# Patient Record
Sex: FEMALE | Race: WHITE | NOT HISPANIC OR LATINO | URBAN - NONMETROPOLITAN AREA
[De-identification: names, ages, dates, MRNs, and addresses within clinical notes are randomized per-mention and may not be internally consistent; named-entity substitution may affect disease eponyms.]

---

## 2019-03-15 ENCOUNTER — IMPORTED ENCOUNTER (OUTPATIENT)
Dept: URBAN - NONMETROPOLITAN AREA CLINIC 1 | Facility: CLINIC | Age: 59
End: 2019-03-15

## 2019-03-15 PROBLEM — H43.812: Noted: 2019-03-15

## 2019-03-15 PROCEDURE — 92014 COMPRE OPH EXAM EST PT 1/>: CPT

## 2019-03-15 NOTE — PATIENT DISCUSSION
PVD OS-Retina flat 360 with no breaks tears or heme.-S&S of RD/RT reviewed with pt.-Stressed that pt should contact our office right away with any changes or increase in symptoms.; 's Notes: ok to fill pf

## 2019-09-24 ENCOUNTER — IMPORTED ENCOUNTER (OUTPATIENT)
Dept: URBAN - NONMETROPOLITAN AREA CLINIC 1 | Facility: CLINIC | Age: 59
End: 2019-09-24

## 2019-09-24 PROBLEM — H10.45: Noted: 2019-09-24

## 2019-09-24 PROBLEM — H15.122: Noted: 2019-09-24

## 2019-09-24 PROCEDURE — 92012 INTRM OPH EXAM EST PATIENT: CPT

## 2019-09-24 NOTE — PATIENT DISCUSSION
EPISCLERTIS OS MILDEDUCATE PTSTART ZYLET TID OS X 5 DAYS*Allergic Conjunctivitis:.-Discussed findings of exam in detail with the patient. - Discussed the episodic nature of the condition and treatment options with the patient. - The use of artificial tears and cool compresses were discussed with patient. - The offending allergen should be avoided if possible. - Patient advised not to rub eyes. - Prescription drops recommended. SAMPLES GIVEN BEPREVE BID OU; 's Notes: ok to fill pf

## 2019-12-17 ENCOUNTER — IMPORTED ENCOUNTER (OUTPATIENT)
Dept: URBAN - NONMETROPOLITAN AREA CLINIC 1 | Facility: CLINIC | Age: 59
End: 2019-12-17

## 2019-12-17 PROBLEM — H52.223: Noted: 2019-12-17

## 2019-12-17 PROBLEM — H40.013: Noted: 2019-12-17

## 2019-12-17 PROBLEM — H52.13: Noted: 2019-12-17

## 2019-12-17 PROCEDURE — 92133 CPTRZD OPH DX IMG PST SGM ON: CPT

## 2019-12-17 PROCEDURE — 92014 COMPRE OPH EXAM EST PT 1/>: CPT

## 2019-12-17 NOTE — PATIENT DISCUSSION
Myopia-Discussed diagnosis with patient. -Explained that people who are myopic are at a higher risk for developing RD/RT and reviewed associated S&S.-Pt to contact our office if symptoms develop. Astigmatism-Discussed diagnosis with patient. Updated spec Rx given. Recommend lens that will provide comfort as well as protect safety and health of eyes. Glaucoma Suspect-Based on bordeline IOP-Appears stable at this time.-Continue to monitor with exams and testing.; 's Notes: ok to fill pf

## 2020-02-12 ENCOUNTER — IMPORTED ENCOUNTER (OUTPATIENT)
Dept: URBAN - NONMETROPOLITAN AREA CLINIC 1 | Facility: CLINIC | Age: 60
End: 2020-02-12

## 2020-02-12 PROBLEM — H52.223: Noted: 2020-02-12

## 2020-02-12 PROBLEM — H43.812: Noted: 2020-02-12

## 2020-02-12 PROBLEM — H52.13: Noted: 2020-02-12

## 2020-02-12 PROBLEM — H40.013: Noted: 2020-02-12

## 2020-02-12 PROCEDURE — 92014 COMPRE OPH EXAM EST PT 1/>: CPT

## 2020-02-12 NOTE — PATIENT DISCUSSION
PVD OSEDUCATE PTON S&S OF RDMONITORMyopia-Discussed diagnosis with patient. -Explained that people who are myopic are at a higher risk for developing RD/RT and reviewed associated S&S.-Pt to contact our office if symptoms develop. Astigmatism-Discussed diagnosis with patient. Updated spec Rx given. Recommend lens that will provide comfort as well as protect safety and health of eyes. Glaucoma Suspect-Based on bordeline IOP-Appears stable at this time.-Continue to monitor with exams and testing.; 's Notes: ok to fill pf

## 2020-12-21 ENCOUNTER — IMPORTED ENCOUNTER (OUTPATIENT)
Dept: URBAN - NONMETROPOLITAN AREA CLINIC 1 | Facility: CLINIC | Age: 60
End: 2020-12-21

## 2020-12-21 PROBLEM — H40.013: Noted: 2020-12-21

## 2020-12-21 PROBLEM — H52.13: Noted: 2020-12-21

## 2020-12-21 PROBLEM — H52.223: Noted: 2020-12-21

## 2020-12-21 PROCEDURE — 92014 COMPRE OPH EXAM EST PT 1/>: CPT

## 2020-12-21 PROCEDURE — 92015 DETERMINE REFRACTIVE STATE: CPT

## 2020-12-21 NOTE — PATIENT DISCUSSION
Glaucoma Suspect-Based on bordeline IOP-Appears stable at this time.-Continue to monitor with exams and testing. Myopia-Discussed diagnosis with patient. -Explained that people who are myopic are at a higher risk for developing RD/RT and reviewed associated S&S.-Pt to contact our office if symptoms develop. Astigmatism-Discussed diagnosis with patient. Updated spec Rx given.   Recommend lens that will provide comfort as well as protect safety and health of eyes.; 's Notes: ok to fill pf

## 2021-08-24 ENCOUNTER — IMPORTED ENCOUNTER (OUTPATIENT)
Dept: URBAN - NONMETROPOLITAN AREA CLINIC 1 | Facility: CLINIC | Age: 61
End: 2021-08-24

## 2021-08-24 PROBLEM — H52.223: Noted: 2021-08-24

## 2021-08-24 PROBLEM — H43.811: Noted: 2021-08-24

## 2021-08-24 PROBLEM — H40.013: Noted: 2021-08-24

## 2021-08-24 PROBLEM — H52.13: Noted: 2021-08-24

## 2021-08-24 PROCEDURE — 99214 OFFICE O/P EST MOD 30 MIN: CPT

## 2021-08-24 NOTE — PATIENT DISCUSSION
PVD OD-Retina flat 360 with no breaks tears or heme.-S&S of RD/RT reviewed with pt.-Stressed that pt should contact our office right away with any changes or increase in symptoms.; 's Notes: ok to fill pf

## 2021-11-01 ENCOUNTER — IMPORTED ENCOUNTER (OUTPATIENT)
Dept: URBAN - NONMETROPOLITAN AREA CLINIC 1 | Facility: CLINIC | Age: 61
End: 2021-11-01

## 2021-11-01 PROCEDURE — 92014 COMPRE OPH EXAM EST PT 1/>: CPT

## 2021-11-01 NOTE — PATIENT DISCUSSION
PVD OD OLD-Retina flat 360 with no breaks tears or heme.-S&S of RD/RT reviewed with pt.-Stressed that pt should contact our office right away with any changes or increase in symptoms. Glaucoma Suspect-Based on IOP-Appears stable at this time.-Continue to monitor with exams and testing. Myopia-Discussed diagnosis with patient. -Explained that people who are myopic are at a higher risk for developing RD/RT and reviewed associated S&S.-Pt to contact our office if symptoms develop. Astigmatism-Discussed diagnosis with patient. Presbyopia-Discussed diagnosis with patient. Updated spec Rx given.   Recommend lens that will provide comfort as well as protect safety and health of eyes.; 's Notes: ok to fill pf

## 2022-04-10 ASSESSMENT — VISUAL ACUITY
OD_SC: 20/20
OD_SC: 20/20
OS_SC: 20/20
OS_SC: 20/20
OD_CC: J1
OD_CC: J1
OD_SC: 20/20
OD_SC: 20/25+
OD_CC: J1
OS_SC: 20/20
OS_CC: J1
OS_CC: J1
OS_SC: 20/20
OS_SC: 20/20
OD_SC: 20/20
OD_SC: 20/20
OS_SC: 20/20
OS_CC: J1
OD_SC: 20/20
OS_SC: 20/25+
OU_SC: 20/15

## 2022-04-10 ASSESSMENT — PACHYMETRY
OD_CT_UM: 557; ADJ: WNL
OD_CT_UM: 557; ADJ: WNL
OS_CT_UM: 558; ADJ: WNL
OS_CT_UM: 558; ADJ: WNL
OD_CT_UM: 557; ADJ: WNL
OS_CT_UM: 558; ADJ: WNL
OD_CT_UM: 557; ADJ: WNL
OD_CT_UM: 557; ADJ: WNL
OS_CT_UM: 558; ADJ: WNL

## 2022-04-10 ASSESSMENT — TONOMETRY
OS_IOP_MMHG: 19
OD_IOP_MMHG: 20
OD_IOP_MMHG: 19
OS_IOP_MMHG: 18
OS_IOP_MMHG: 20
OS_IOP_MMHG: 19
OD_IOP_MMHG: 18
OD_IOP_MMHG: 18
OS_IOP_MMHG: 19
OD_IOP_MMHG: 20
OS_IOP_MMHG: 18
OD_IOP_MMHG: 19

## 2024-02-20 ENCOUNTER — COMPREHENSIVE EXAM (OUTPATIENT)
Dept: RURAL CLINIC 1 | Facility: CLINIC | Age: 64
End: 2024-02-20

## 2024-02-20 DIAGNOSIS — H43.811: ICD-10-CM

## 2024-02-20 DIAGNOSIS — H40.013: ICD-10-CM

## 2024-02-20 PROCEDURE — 92014 COMPRE OPH EXAM EST PT 1/>: CPT

## 2024-02-20 PROCEDURE — 92133 CPTRZD OPH DX IMG PST SGM ON: CPT

## 2024-02-20 ASSESSMENT — VISUAL ACUITY
OD_CC: 20/20
OS_CC: 20/20

## 2024-02-20 ASSESSMENT — TONOMETRY
OS_IOP_MMHG: 16
OD_IOP_MMHG: 16

## 2025-02-25 ENCOUNTER — COMPREHENSIVE EXAM (OUTPATIENT)
Age: 65
End: 2025-02-25

## 2025-02-25 DIAGNOSIS — H43.811: ICD-10-CM

## 2025-02-25 DIAGNOSIS — H40.013: ICD-10-CM

## 2025-02-25 PROCEDURE — 92014 COMPRE OPH EXAM EST PT 1/>: CPT

## 2025-02-25 PROCEDURE — 92083 EXTENDED VISUAL FIELD XM: CPT
